# Patient Record
Sex: FEMALE | URBAN - METROPOLITAN AREA
[De-identification: names, ages, dates, MRNs, and addresses within clinical notes are randomized per-mention and may not be internally consistent; named-entity substitution may affect disease eponyms.]

---

## 2024-02-28 ENCOUNTER — APPOINTMENT (RX ONLY)
Dept: URBAN - METROPOLITAN AREA CLINIC 333 | Facility: CLINIC | Age: 54
Setting detail: DERMATOLOGY
End: 2024-02-28

## 2024-02-28 DIAGNOSIS — Z41.9 ENCOUNTER FOR PROCEDURE FOR PURPOSES OTHER THAN REMEDYING HEALTH STATE, UNSPECIFIED: ICD-10-CM

## 2024-02-28 PROCEDURE — ? HYDRAFACIAL

## 2024-02-28 NOTE — PROCEDURE: HYDRAFACIAL
Tip: Hydropeel Tip, Teal
Tip: Hydropeel Tip, Clear
Vacuum Pressure High Setting (Will Not Render If Set To 0): 0
Solution Override
Treatment Number: 1
Price (Use Numbers Only, No Special Characters Or $): 051
Comments: Added dermaplaning \\nPatient has done facials before but never HydraFacial \\nShe is also on skincare and lives out of town.
Tip: Hydropeel Tip, Blue
Indication: anti-aging
Solution Override: intellishade
Procedure: Boost
Procedure: Exfoliation
Solution: Beta-HD
Procedure: Fusion
Consent: Verbal consent obtained, risks reviewed including but not limited to crusting, scabbing, blistering, scarring, darker or lighter pigmentary change, bruising, and/or incomplete response.
Post-Care Instructions: I reviewed with the patient in detail post-care instructions. Patient should stay away from the sun and wear sun protection until treated areas are fully healed.
Procedure: Peel
Tip Override
Location: face
Solution: Activ-4
Procedure: Extraction
Procedure: Extend and Protect
Solution: Antiox-6
Solution: GlySal 7.5%